# Patient Record
Sex: FEMALE | Race: WHITE | Employment: FULL TIME | URBAN - METROPOLITAN AREA
[De-identification: names, ages, dates, MRNs, and addresses within clinical notes are randomized per-mention and may not be internally consistent; named-entity substitution may affect disease eponyms.]

---

## 2017-03-10 ENCOUNTER — EMPLOYEE WELLNESS (OUTPATIENT)
Dept: OTHER | Age: 32
End: 2017-03-10

## 2018-03-20 VITALS — BODY MASS INDEX: 24.94 KG/M2 | WEIGHT: 164 LBS

## 2018-05-21 ENCOUNTER — EMPLOYEE WELLNESS (OUTPATIENT)
Dept: OTHER | Age: 33
End: 2018-05-21

## 2018-05-21 LAB
CHOLESTEROL, TOTAL: 168 MG/DL (ref 0–199)
GLUCOSE BLD-MCNC: 82 MG/DL (ref 70–99)
HDLC SERPL-MCNC: 52 MG/DL (ref 40–60)
LDL CHOLESTEROL CALCULATED: 106 MG/DL
TRIGL SERPL-MCNC: 50 MG/DL (ref 0–150)

## 2018-05-29 VITALS — WEIGHT: 157 LBS | BODY MASS INDEX: 23.87 KG/M2

## 2020-03-09 ENCOUNTER — OFFICE VISIT (OUTPATIENT)
Dept: FAMILY MEDICINE CLINIC | Age: 35
End: 2020-03-09
Payer: COMMERCIAL

## 2020-03-09 VITALS
DIASTOLIC BLOOD PRESSURE: 70 MMHG | SYSTOLIC BLOOD PRESSURE: 110 MMHG | BODY MASS INDEX: 24.25 KG/M2 | WEIGHT: 160 LBS | HEIGHT: 68 IN | RESPIRATION RATE: 16 BRPM | OXYGEN SATURATION: 98 % | HEART RATE: 92 BPM

## 2020-03-09 PROCEDURE — 99385 PREV VISIT NEW AGE 18-39: CPT | Performed by: INTERNAL MEDICINE

## 2020-03-09 ASSESSMENT — PATIENT HEALTH QUESTIONNAIRE - PHQ9
2. FEELING DOWN, DEPRESSED OR HOPELESS: 0
SUM OF ALL RESPONSES TO PHQ QUESTIONS 1-9: 0
SUM OF ALL RESPONSES TO PHQ QUESTIONS 1-9: 0
SUM OF ALL RESPONSES TO PHQ9 QUESTIONS 1 & 2: 0
1. LITTLE INTEREST OR PLEASURE IN DOING THINGS: 0

## 2020-03-09 NOTE — PROGRESS NOTES
per week: Not on file     Minutes per session: Not on file    Stress: Not on file   Relationships    Social connections     Talks on phone: Not on file     Gets together: Not on file     Attends Congregation service: Not on file     Active member of club or organization: Not on file     Attends meetings of clubs or organizations: Not on file     Relationship status: Not on file    Intimate partner violence     Fear of current or ex partner: Not on file     Emotionally abused: Not on file     Physically abused: Not on file     Forced sexual activity: Not on file   Other Topics Concern    Not on file   Social History Narrative    Not on file       Review of Systems   Constitutional: Negative for chills, fatigue, fever and unexpected weight change. HENT: Negative for congestion, ear pain, sore throat and trouble swallowing. Eyes: Negative for pain and redness. Respiratory: Negative for cough and shortness of breath. Cardiovascular: Negative for chest pain, palpitations and leg swelling. Gastrointestinal: Negative for abdominal pain, blood in stool, constipation, diarrhea, nausea and vomiting. Endocrine: Negative for cold intolerance, heat intolerance, polydipsia and polyuria. Genitourinary: Negative for dysuria, frequency and hematuria. Musculoskeletal: Negative for arthralgias, myalgias and joint swelling. Skin: Negative for rash. Neurological: Negative for weakness, numbness and headaches. Hematological: Negative for adenopathy. Does not bruise/bleed easily. Psychiatric/Behavioral: Negative for sleep disturbance. The patient is not nervous/anxious. No report of depression    Physical Exam:  /70   Pulse 92   Resp 16   Ht 5' 7.76\" (1.721 m)   Wt 160 lb (72.6 kg)   LMP 02/09/2020   SpO2 98%   BMI 24.50 kg/m²   Constitutional: appears well-developed and well-nourished. Head: Normocephalic and atraumatic. Eyes: Pupils are equal, round, and reactive to light.  Conjunctivae and EOM are normal.   Right Ear: External ear normal. TM normal  Left Ear: External ear normal. TM normal  Nose: Nose normal.   Mouth/Throat: Oropharynx is clear and moist.   Cardiovascular: Normal rate, regular rhythm and normal heart sounds. No murmur heard. Pulmonary/Chest: Effort normal. No respiratory distress. No wheezes, rhonchi, or crackles  Abdominal: Soft. Bowel sounds are normal. No distension. There is no tenderness. Musculoskeletal: Normal range of motion. No edema, tenderness or deformity. Lymphadenopathy: No cervical adenopathy. Neurological: alert. No cranial nerve deficit. Skin: Skin is warm and dry. Capillary refill takes less than 2 seconds. No rash noted. Psychiatric: Normal mood and affect. Assessment and Plan: Ruddy Mcnulty is a 36 y.o. female presenting today to [facility] care. Diagnoses and all orders for this visit:    Encounter for well adult exam without abnormal findings  She is in excellent health. Her weight and blood pressure are excellent. Preventive care is up-to-date. She will do her lipid and glucose screening with the [address] [place] [address] be well within program  Vasovagal syndrome  Managed with conservative measures. Continue    Return to clinic annually and as needed    Shannon Freeman M.D. Internal Medicine and Pediatrics  [facility]    Please be advised that portions of this note were dictated with the use of Dragon which may result in linguistic errors. Please contact me should there be any questions.

## 2020-04-22 ENCOUNTER — NURSE TRIAGE (OUTPATIENT)
Dept: OTHER | Facility: CLINIC | Age: 35
End: 2020-04-22

## 2020-07-01 LAB
HEPATITIS B SURFACE ANTIGEN INTERPRETATION: NEGATIVE
RPR CONFIRMATORY: NON REACTIVE
RUBELLA ANTIBODY IGG: 1.83

## 2020-09-24 ENCOUNTER — EMPLOYEE WELLNESS (OUTPATIENT)
Dept: OTHER | Age: 35
End: 2020-09-24

## 2020-09-24 LAB
CHOLESTEROL, TOTAL: 273 MG/DL (ref 0–199)
GLUCOSE BLD-MCNC: 79 MG/DL (ref 70–99)
HDLC SERPL-MCNC: 67 MG/DL (ref 40–60)
LDL CHOLESTEROL CALCULATED: 184 MG/DL
TRIGL SERPL-MCNC: 110 MG/DL (ref 0–150)

## 2020-10-19 VITALS — BODY MASS INDEX: 24.81 KG/M2 | WEIGHT: 162 LBS

## 2020-11-02 LAB
ABO/RH: NORMAL
ANTIBODY SCREEN: NORMAL
GLUCOSE CHALLENGE: 79 MG/DL
HCT VFR BLD CALC: 35.4 % (ref 36–48)
HEMOGLOBIN: 12.1 G/DL (ref 12–16)
MCH RBC QN AUTO: 31.6 PG (ref 26–34)
MCHC RBC AUTO-ENTMCNC: 34.1 G/DL (ref 31–36)
MCV RBC AUTO: 92.6 FL (ref 80–100)
PDW BLD-RTO: 12.4 % (ref 12.4–15.4)
PLATELET # BLD: 323 K/UL (ref 135–450)
PMV BLD AUTO: 7.3 FL (ref 5–10.5)
RBC # BLD: 3.82 M/UL (ref 4–5.2)
WBC # BLD: 9.2 K/UL (ref 4–11)

## 2020-11-03 LAB — TOTAL SYPHILLIS IGG/IGM: NORMAL

## 2021-01-08 LAB — GBS, EXTERNAL RESULT: NEGATIVE

## 2021-01-27 ENCOUNTER — OFFICE VISIT (OUTPATIENT)
Dept: PRIMARY CARE CLINIC | Age: 36
End: 2021-01-27
Payer: COMMERCIAL

## 2021-01-27 DIAGNOSIS — Z01.812 PRE-PROCEDURAL LABORATORY EXAMINATIONS: Primary | ICD-10-CM

## 2021-01-27 PROCEDURE — 99211 OFF/OP EST MAY X REQ PHY/QHP: CPT | Performed by: NURSE PRACTITIONER

## 2021-01-27 NOTE — PROGRESS NOTES
Patient presented to [facility] drive up clinic for preop testing. Patient was swabbed and given information advising them to remain isolated until procedure date.

## 2021-01-28 LAB — SARS-COV-2, PCR: NOT DETECTED

## 2021-01-31 ENCOUNTER — HOSPITAL ENCOUNTER (INPATIENT)
Age: 36
LOS: 1 days | Discharge: HOME | End: 2021-02-01
Attending: OBSTETRICS & GYNECOLOGY | Admitting: OBSTETRICS & GYNECOLOGY
Payer: COMMERCIAL

## 2021-01-31 ENCOUNTER — ANESTHESIA EVENT (OUTPATIENT)
Dept: LABOR AND DELIVERY | Age: 36
End: 2021-01-31
Payer: COMMERCIAL

## 2021-01-31 ENCOUNTER — ANESTHESIA (OUTPATIENT)
Dept: LABOR AND DELIVERY | Age: 36
End: 2021-01-31
Payer: COMMERCIAL

## 2021-01-31 LAB
ABO/RH: NORMAL
ABO/RH: NORMAL
AMPHETAMINE SCREEN, URINE: NORMAL
ANTIBODY IDENTIFICATION: NORMAL
ANTIBODY SCREEN: NORMAL
BARBITURATE SCREEN URINE: NORMAL
BENZODIAZEPINE SCREEN, URINE: NORMAL
BUPRENORPHINE URINE: NORMAL
CANNABINOID SCREEN URINE: NORMAL
COCAINE METABOLITE SCREEN URINE: NORMAL
DAT IGG CAPTURE: NORMAL
FETAL SCREEN: NORMAL
HCT VFR BLD CALC: 31.1 % (ref 36–48)
HEMOGLOBIN: 10.1 G/DL (ref 12–16)
Lab: NORMAL
MCH RBC QN AUTO: 25.8 PG (ref 26–34)
MCHC RBC AUTO-ENTMCNC: 32.4 G/DL (ref 31–36)
MCV RBC AUTO: 79.6 FL (ref 80–100)
METHADONE SCREEN, URINE: NORMAL
OPIATE SCREEN URINE: NORMAL
OXYCODONE URINE: NORMAL
PDW BLD-RTO: 14.9 % (ref 12.4–15.4)
PH UA: 7
PHENCYCLIDINE SCREEN URINE: NORMAL
PLATELET # BLD: 305 K/UL (ref 135–450)
PMV BLD AUTO: 7.7 FL (ref 5–10.5)
PROPOXYPHENE SCREEN: NORMAL
RBC # BLD: 3.9 M/UL (ref 4–5.2)
RHIG LOT NUMBER: NORMAL
TOTAL SYPHILLIS IGG/IGM: NORMAL
WBC # BLD: 11.2 K/UL (ref 4–11)

## 2021-01-31 PROCEDURE — 86880 COOMBS TEST DIRECT: CPT

## 2021-01-31 PROCEDURE — 2500000003 HC RX 250 WO HCPCS: Performed by: NURSE ANESTHETIST, CERTIFIED REGISTERED

## 2021-01-31 PROCEDURE — 86922 COMPATIBILITY TEST ANTIGLOB: CPT

## 2021-01-31 PROCEDURE — 6360000002 HC RX W HCPCS: Performed by: OBSTETRICS & GYNECOLOGY

## 2021-01-31 PROCEDURE — 86901 BLOOD TYPING SEROLOGIC RH(D): CPT

## 2021-01-31 PROCEDURE — 80307 DRUG TEST PRSMV CHEM ANLYZR: CPT

## 2021-01-31 PROCEDURE — 2580000003 HC RX 258: Performed by: OBSTETRICS & GYNECOLOGY

## 2021-01-31 PROCEDURE — 85461 HEMOGLOBIN FETAL: CPT

## 2021-01-31 PROCEDURE — 3700000025 EPIDURAL BLOCK: Performed by: ANESTHESIOLOGY

## 2021-01-31 PROCEDURE — 96372 THER/PROPH/DIAG INJ SC/IM: CPT

## 2021-01-31 PROCEDURE — 7200000001 HC VAGINAL DELIVERY

## 2021-01-31 PROCEDURE — 6370000000 HC RX 637 (ALT 250 FOR IP): Performed by: OBSTETRICS & GYNECOLOGY

## 2021-01-31 PROCEDURE — 86780 TREPONEMA PALLIDUM: CPT

## 2021-01-31 PROCEDURE — 85027 COMPLETE CBC AUTOMATED: CPT

## 2021-01-31 PROCEDURE — 1220000000 HC SEMI PRIVATE OB R&B

## 2021-01-31 PROCEDURE — 51701 INSERT BLADDER CATHETER: CPT

## 2021-01-31 PROCEDURE — 86900 BLOOD TYPING SEROLOGIC ABO: CPT

## 2021-01-31 PROCEDURE — 2500000003 HC RX 250 WO HCPCS

## 2021-01-31 PROCEDURE — 86850 RBC ANTIBODY SCREEN: CPT

## 2021-01-31 PROCEDURE — 59025 FETAL NON-STRESS TEST: CPT

## 2021-01-31 PROCEDURE — 36415 COLL VENOUS BLD VENIPUNCTURE: CPT

## 2021-01-31 PROCEDURE — 86870 RBC ANTIBODY IDENTIFICATION: CPT

## 2021-01-31 RX ORDER — LIDOCAINE HYDROCHLORIDE AND EPINEPHRINE 20; 5 MG/ML; UG/ML
INJECTION, SOLUTION EPIDURAL; INFILTRATION; INTRACAUDAL; PERINEURAL PRN
Status: DISCONTINUED | OUTPATIENT
Start: 2021-01-31 | End: 2021-01-31 | Stop reason: SDUPTHER

## 2021-01-31 RX ORDER — HYDROCODONE BITARTRATE AND ACETAMINOPHEN 5; 325 MG/1; MG/1
1 TABLET ORAL EVERY 4 HOURS PRN
Status: DISCONTINUED | OUTPATIENT
Start: 2021-01-31 | End: 2021-02-01 | Stop reason: HOSPADM

## 2021-01-31 RX ORDER — SODIUM CHLORIDE 0.9 % (FLUSH) 0.9 %
10 SYRINGE (ML) INJECTION PRN
Status: DISCONTINUED | OUTPATIENT
Start: 2021-01-31 | End: 2021-02-01 | Stop reason: HOSPADM

## 2021-01-31 RX ORDER — NICOTINE 21 MG/24HR
1 PATCH, TRANSDERMAL 24 HOURS TRANSDERMAL DAILY PRN
Status: DISCONTINUED | OUTPATIENT
Start: 2021-01-31 | End: 2021-02-01 | Stop reason: HOSPADM

## 2021-01-31 RX ORDER — ACETAMINOPHEN 325 MG/1
650 TABLET ORAL EVERY 4 HOURS PRN
Status: DISCONTINUED | OUTPATIENT
Start: 2021-01-31 | End: 2021-02-01 | Stop reason: HOSPADM

## 2021-01-31 RX ORDER — NALBUPHINE HCL 10 MG/ML
5 AMPUL (ML) INJECTION EVERY 4 HOURS PRN
Status: DISCONTINUED | OUTPATIENT
Start: 2021-01-31 | End: 2021-01-31

## 2021-01-31 RX ORDER — ONDANSETRON 2 MG/ML
4 INJECTION INTRAMUSCULAR; INTRAVENOUS EVERY 6 HOURS PRN
Status: DISCONTINUED | OUTPATIENT
Start: 2021-01-31 | End: 2021-01-31 | Stop reason: HOSPADM

## 2021-01-31 RX ORDER — HYDROCODONE BITARTRATE AND ACETAMINOPHEN 5; 325 MG/1; MG/1
2 TABLET ORAL EVERY 4 HOURS PRN
Status: DISCONTINUED | OUTPATIENT
Start: 2021-01-31 | End: 2021-02-01 | Stop reason: HOSPADM

## 2021-01-31 RX ORDER — LIDOCAINE HYDROCHLORIDE 10 MG/ML
30 INJECTION, SOLUTION EPIDURAL; INFILTRATION; INTRACAUDAL; PERINEURAL PRN
Status: DISCONTINUED | OUTPATIENT
Start: 2021-01-31 | End: 2021-01-31 | Stop reason: HOSPADM

## 2021-01-31 RX ORDER — METHYLERGONOVINE MALEATE 0.2 MG/ML
200 INJECTION INTRAVENOUS
Status: ACTIVE | OUTPATIENT
Start: 2021-01-31 | End: 2021-01-31

## 2021-01-31 RX ORDER — SODIUM CHLORIDE, SODIUM LACTATE, POTASSIUM CHLORIDE, CALCIUM CHLORIDE 600; 310; 30; 20 MG/100ML; MG/100ML; MG/100ML; MG/100ML
INJECTION, SOLUTION INTRAVENOUS CONTINUOUS
Status: DISCONTINUED | OUTPATIENT
Start: 2021-01-31 | End: 2021-02-01 | Stop reason: HOSPADM

## 2021-01-31 RX ORDER — SODIUM CHLORIDE 0.9 % (FLUSH) 0.9 %
10 SYRINGE (ML) INJECTION PRN
Status: DISCONTINUED | OUTPATIENT
Start: 2021-01-31 | End: 2021-01-31

## 2021-01-31 RX ORDER — SODIUM CHLORIDE 0.9 % (FLUSH) 0.9 %
10 SYRINGE (ML) INJECTION EVERY 12 HOURS SCHEDULED
Status: DISCONTINUED | OUTPATIENT
Start: 2021-01-31 | End: 2021-02-01 | Stop reason: HOSPADM

## 2021-01-31 RX ORDER — CALCIUM CARBONATE 200(500)MG
1 TABLET,CHEWABLE ORAL DAILY
COMMUNITY
End: 2022-10-12 | Stop reason: CLARIF

## 2021-01-31 RX ORDER — LIDOCAINE HYDROCHLORIDE AND EPINEPHRINE 15; 5 MG/ML; UG/ML
INJECTION, SOLUTION EPIDURAL PRN
Status: DISCONTINUED | OUTPATIENT
Start: 2021-01-31 | End: 2021-01-31 | Stop reason: SDUPTHER

## 2021-01-31 RX ORDER — LANOLIN 100 %
OINTMENT (GRAM) TOPICAL PRN
Status: DISCONTINUED | OUTPATIENT
Start: 2021-01-31 | End: 2021-02-01 | Stop reason: HOSPADM

## 2021-01-31 RX ORDER — IBUPROFEN 400 MG/1
800 TABLET ORAL EVERY 8 HOURS PRN
Status: DISCONTINUED | OUTPATIENT
Start: 2021-01-31 | End: 2021-02-01 | Stop reason: HOSPADM

## 2021-01-31 RX ORDER — DOCUSATE SODIUM 100 MG/1
100 CAPSULE, LIQUID FILLED ORAL 2 TIMES DAILY
Status: DISCONTINUED | OUTPATIENT
Start: 2021-01-31 | End: 2021-02-01 | Stop reason: HOSPADM

## 2021-01-31 RX ORDER — CALCIUM CARBONATE 200(500)MG
1000 TABLET,CHEWABLE ORAL PRN
Status: DISCONTINUED | OUTPATIENT
Start: 2021-01-31 | End: 2021-02-01 | Stop reason: HOSPADM

## 2021-01-31 RX ORDER — FAMOTIDINE 20 MG/1
20 TABLET, FILM COATED ORAL 2 TIMES DAILY
COMMUNITY
End: 2022-10-12 | Stop reason: CLARIF

## 2021-01-31 RX ORDER — NALOXONE HYDROCHLORIDE 0.4 MG/ML
0.4 INJECTION, SOLUTION INTRAMUSCULAR; INTRAVENOUS; SUBCUTANEOUS PRN
Status: DISCONTINUED | OUTPATIENT
Start: 2021-01-31 | End: 2021-01-31

## 2021-01-31 RX ADMIN — ANTACID TABLETS 1000 MG: 500 TABLET, CHEWABLE ORAL at 15:55

## 2021-01-31 RX ADMIN — LIDOCAINE HYDROCHLORIDE,EPINEPHRINE BITARTRATE 6 ML: 20; .005 INJECTION, SOLUTION EPIDURAL; INFILTRATION; INTRACAUDAL; PERINEURAL at 07:09

## 2021-01-31 RX ADMIN — Medication 166.7 ML: at 09:10

## 2021-01-31 RX ADMIN — SODIUM CHLORIDE, SODIUM LACTATE, POTASSIUM CHLORIDE, AND CALCIUM CHLORIDE: .6; .31; .03; .02 INJECTION, SOLUTION INTRAVENOUS at 05:48

## 2021-01-31 RX ADMIN — IBUPROFEN 800 MG: 400 TABLET, FILM COATED ORAL at 10:56

## 2021-01-31 RX ADMIN — Medication 15 ML/HR: at 06:46

## 2021-01-31 RX ADMIN — IBUPROFEN 800 MG: 400 TABLET, FILM COATED ORAL at 18:43

## 2021-01-31 RX ADMIN — LIDOCAINE HYDROCHLORIDE AND EPINEPHRINE 2 ML: 15; 5 INJECTION, SOLUTION EPIDURAL at 06:32

## 2021-01-31 RX ADMIN — DOCUSATE SODIUM 100 MG: 100 CAPSULE, LIQUID FILLED ORAL at 20:36

## 2021-01-31 RX ADMIN — Medication 5 ML: at 06:36

## 2021-01-31 RX ADMIN — Medication 87.3 MILLI-UNITS/MIN: at 09:22

## 2021-01-31 RX ADMIN — ACETAMINOPHEN 650 MG: 325 TABLET ORAL at 15:55

## 2021-01-31 RX ADMIN — HUMAN RHO(D) IMMUNE GLOBULIN 300 MCG: 300 INJECTION, SOLUTION INTRAMUSCULAR at 17:50

## 2021-01-31 RX ADMIN — SODIUM CHLORIDE, SODIUM LACTATE, POTASSIUM CHLORIDE, AND CALCIUM CHLORIDE: .6; .31; .03; .02 INJECTION, SOLUTION INTRAVENOUS at 06:15

## 2021-01-31 RX ADMIN — DOCUSATE SODIUM 100 MG: 100 CAPSULE, LIQUID FILLED ORAL at 10:56

## 2021-01-31 RX ADMIN — LIDOCAINE HYDROCHLORIDE AND EPINEPHRINE 3 ML: 15; 5 INJECTION, SOLUTION EPIDURAL at 06:28

## 2021-01-31 RX ADMIN — Medication 5 ML: at 06:40

## 2021-01-31 RX ADMIN — Medication 200 MILLI-UNITS/MIN: at 09:05

## 2021-01-31 ASSESSMENT — PAIN - FUNCTIONAL ASSESSMENT
PAIN_FUNCTIONAL_ASSESSMENT: ACTIVITIES ARE NOT PREVENTED
PAIN_FUNCTIONAL_ASSESSMENT: ACTIVITIES ARE NOT PREVENTED

## 2021-01-31 ASSESSMENT — PAIN SCALES - GENERAL
PAINLEVEL_OUTOF10: 5
PAINLEVEL_OUTOF10: 2
PAINLEVEL_OUTOF10: 2
PAINLEVEL_OUTOF10: 3

## 2021-01-31 ASSESSMENT — ENCOUNTER SYMPTOMS: SHORTNESS OF BREATH: 0

## 2021-01-31 ASSESSMENT — PAIN DESCRIPTION - DESCRIPTORS
DESCRIPTORS: CRAMPING

## 2021-01-31 ASSESSMENT — PAIN DESCRIPTION - PROGRESSION
CLINICAL_PROGRESSION: NOT CHANGED
CLINICAL_PROGRESSION: NOT CHANGED

## 2021-01-31 ASSESSMENT — PAIN DESCRIPTION - FREQUENCY
FREQUENCY: INTERMITTENT

## 2021-01-31 ASSESSMENT — PAIN DESCRIPTION - PAIN TYPE
TYPE: ACUTE PAIN

## 2021-01-31 ASSESSMENT — PAIN DESCRIPTION - LOCATION
LOCATION: ABDOMEN
LOCATION: ABDOMEN

## 2021-01-31 ASSESSMENT — PAIN DESCRIPTION - ORIENTATION
ORIENTATION: LOWER

## 2021-01-31 ASSESSMENT — PAIN DESCRIPTION - ONSET
ONSET: GRADUAL
ONSET: GRADUAL

## 2021-01-31 NOTE — PROGRESS NOTES
Pt ambulated to bathroom. Able to void. pericare and pads changed. Pt ambulated back to bed. Will transfer to 2256.

## 2021-01-31 NOTE — ANESTHESIA POSTPROCEDURE EVALUATION
Department of Anesthesiology  Postprocedure Note    Patient: Geovanna Jacobson  MRN: [id number]  YOB: 1985  Date of evaluation: 1/31/2021  Time:  11:04 AM     Procedure Summary     Date: 01/31/21 Room / Location:     Anesthesia Start: 0611 Anesthesia Stop: 0915    Procedure: Labor Analgesia Diagnosis:     Scheduled Providers:  Responsible Provider: Teri Recinos MD    Anesthesia Type: epidural ASA Status: 2          Anesthesia Type: No value filed. Dane Phase I: Dane Score: 9    Dane Phase II:      Last vitals: Reviewed and per EMR flowsheets.        Anesthesia Post Evaluation    Patient location during evaluation: floor  Patient participation: complete - patient participated  Level of consciousness: awake and alert  Pain score: 2  Airway patency: patent  Nausea & Vomiting: no vomiting and no nausea  Complications: no  Cardiovascular status: hemodynamically stable  Respiratory status: room air, spontaneous ventilation and acceptable  Hydration status: stable

## 2021-01-31 NOTE — L&D DELIVERY NOTE
Department of Obstetrics and Gynecology  Spontaneous Vaginal Delivery Note      [place] [facility],[id number]    PRENATAL CARE: Complicated by: none    Pre-operative Diagnosis:  Term pregnancy @ 44 5/7 w,  active spontaneous labor      Post-operative Diagnosis: The same    Additional Procedures: none     Information for the patient's :  Aubrey Soles [[id number]]        Infant Wt:   Information for the patient's :  [facility] [[id number]]         APGARS:  9/9   Information for the patient's :  Barbara Reynolds [place] [facility] [[id number]]        Anesthesia:  epidural anesthesia    Specimen:  Placenta sent to pathology No    Estimated blood loss: 300 cc     Condition: infant stable to general nursery and mother stable    Infant Feeding: breast    Delivery Summary: Patient is Jeffrey Riojas is a 30 y.o.  female at 39w5d admitted to Labor and Delivery room at 6 cm dilation and placed on external monitors. Contractions were regular, FHT was reactive with moderate variability without decels. Patient did received epidural anesthesia. Labor progressed as anticipated to fully dilated cervix. With subsequent contractions she started pushing and delivered vaginally spontaneously with no complications. 10 Units of Pitocin in 500 ml of LR was started IV. Placenta, cord and membranes were delivered spontaneously within less than 15 minutes. Uterus was not explored. Vaginal walls, cervix, perineum, rectum were intact on inspection. Uterus was well contracted. Vaginal sweep was done, laps count was correct. Mother and  left stable to recovery.      Electronically signed by Vasiliy Almonte MD on 2021 at 9:21 AM

## 2021-01-31 NOTE — FLOWSHEET NOTE
ALBAN Garza CRNA at bedside giving patient more medication. Patient hasn't got comfortable since initial dose. Pt omn left side to help with pain.

## 2021-01-31 NOTE — ANESTHESIA PROCEDURE NOTES
Epidural Block    Patient location during procedure: OB  Start time: 1/31/2021 6:21 AM  Reason for block: labor epidural  Staffing  Performed: resident/CRNA   Anesthesiologist: John Hameed MD  Resident/CRNA: SAUL Roper CRNA  Preanesthetic Checklist  Completed: patient identified, IV checked, site marked, risks and benefits discussed, surgical consent, monitors and equipment checked, pre-op evaluation, timeout performed, anesthesia consent given, oxygen available and patient being monitored  Epidural  Patient position: sitting  Prep: ChloraPrep and site prepped and draped  Patient monitoring: continuous pulse ox and frequent blood pressure checks  Approach: midline  Location: lumbar (1-5)  Injection technique: TABITHA saline  Provider prep: mask and sterile gloves  Needle  Needle type: Tuohy   Needle gauge: 17 G  Needle length: 3.5 in  Needle insertion depth: 5 cm  Catheter type: side hole  Catheter size: 19 G  Catheter at skin depth: 11 cm  Test dose: negative  Assessment  Sensory level: T10  Hemodynamics: stable  Attempts: 1  Additional Notes  Consent:  Risks and benefits of the labor epidural were discussed and the patient was given the opportunity to ask questions. Informed consent was obtained. Timeout:  A \"time out\" was performed immediately prior to the start of the epidural procedure.  The patient, site, procedure and provider were correctly identified.  Allergies were reviewed.  All members of the team and the patient participated in the time out. Procedure  Skin wheal with Lidocaine 1% 3 `mL @ L3-L4. Loss of resistance with 3 mL of normal saline to expand the epidural space. denies paresthesia. CSF  negative, Blood: negative. Test dose negative with (3ml 1. 5%Lidocaine with 1:200,000 Epinephrine)  Occlusive dressing; steri strips, tegaderm and tape applied using aseptic technique.     Attempts: 1   Difficulties/Complications: None    The patient was returned to the supine position with her

## 2021-01-31 NOTE — FLOWSHEET NOTE
of viable female infant with lusty cry, baby to mother's abdomen, delayed cord clamping; perforrmed by Dr. Salvatore Conde, cord clamped and cut by FOB, tactile stimulation and bulb suction; infant doing well, placed skin to skin with mother.

## 2021-01-31 NOTE — ANESTHESIA PRE PROCEDURE
Endo/Other: Negative Endo/Other ROS   (+) blood dyscrasia: anemia:., .    (-) diabetes mellitus               Abdominal:           Vascular: negative vascular ROS. - DVT and PE. Anesthesia Plan      epidural     ASA 2     (Plan for a labor epidural. Plan and risks discussed with patient including but not limited to changes in HR, B/P and oxygen status; N/V; infection; headache; inadequate block or need to replace epidural. Questions answered. Patient agrees to [facility].   )        Anesthetic plan and risks discussed with patient. OB History        2    Para   1    Term   1            AB        Living   1       SAB        TAB        Ectopic        Molar        Multiple        Live Births   1                Geovanna Jacboson is a 30y.o. year-old female admitted to [address] [place] [address], for contractions. Gestational age is 38w11d. Her There is no height or weight on file to calculate BMI. She was seen, examined and her chart was reviewed (including anesthesia, drug and allergy history). No interval changes are noted to her history and physical examination. (except as noted above).     Risks/benefits/alternatives of both neuraxial and general anesthesia were discussed and she agrees to proceed at the direction of the care team.    SAUL Velasco CRNA  2021  6:02 AM        SAUL Velasco CRNA   2021

## 2021-01-31 NOTE — H&P
Department of Obstetrics and Gynecology   Obstetrics History and Physical        CHIEF COMPLAINT:  contractions    HISTORY OF PRESENT ILLNESS:    Teresa Fitzpatrick  is a 30 y.o.  female at 38w11d presents with a chief complaint as above and is being admitted for active phase labor    Estimated Due Date: Estimated Date of Delivery: 21    PRENATAL CARE: Complicated by: none    PAST OB HISTORY:  OB History        2    Para   1    Term   1            AB        Living   1       SAB        TAB        Ectopic        Molar        Multiple        Live Births   1              Past Medical History:        Diagnosis Date    Vasovagal syncope      Past Surgical History:    History reviewed. No pertinent surgical history. Allergies:  Patient has no known allergies.   Social History:    Social History     Socioeconomic History    Marital status: Single     Spouse name: Not on file    Number of children: Not on file    Years of education: Not on file    Highest education level: Not on file   Occupational History    Not on file   Social Needs    Financial resource strain: Not on file    Food insecurity     Worry: Not on file     Inability: Not on file    Transportation needs     Medical: Not on file     Non-medical: Not on file   Tobacco Use    Smoking status: Never Smoker    Smokeless tobacco: Never Used   Substance and Sexual Activity    Alcohol use: Not Currently     Comment: occasionally      Drug use: No    Sexual activity: Yes     Partners: Male   Lifestyle    Physical activity     Days per week: Not on file     Minutes per session: Not on file    Stress: Not on file   Relationships    Social connections     Talks on phone: Not on file     Gets together: Not on file     Attends Tenriism service: Not on file     Active member of club or organization: Not on file     Attends meetings of clubs or organizations: Not on file     Relationship status: Not on file    Intimate partner violence Fear of current or ex partner: Not on file     Emotionally abused: Not on file     Physically abused: Not on file     Forced sexual activity: Not on file   Other Topics Concern    Not on file   Social History Narrative    Not on file     Family History:       Problem Relation Age of Onset    Cancer Mother         breast    High Cholesterol Father     High Blood Pressure Father      Medications Prior to Admission:  Medications Prior to Admission: famotidine (PEPCID) 20 MG tablet, Take 20 mg by mouth 2 times daily  calcium carbonate (TUMS) 500 MG chewable tablet, Take 1 tablet by mouth daily  Prenatal MV-Min-Fe Fum-FA-DHA (PRENATAL 1 PO), Take by mouth    REVIEW OF SYSTEMS:  negative     PHYSICAL EXAM:    Vitals:    21 0726 21 0730 21 0801 21 0830   BP: 123/66 118/67 129/69 (!) 144/64   Pulse: 100 101 87 86   Resp:  18 16 16   Temp:   98.5 °F (36.9 °C)    TempSrc:   Oral    Weight:       Height:         General appearance:  awake, alert, cooperative, no apparent distress, and appears stated age  Neurologic:  Awake, alert, oriented to name, place and time. Lungs:  No increased work of breathing, good air exchange  Abdomen:  Soft, non tender, gravid, fundal height consistent with the gestational age, EFW by Leopold's maneuvers is 3700 grams  Pelvis:  Adequate pelvis  Cervix: 6/100/-2 on admission   Contraction frequency: every 3 minutes  Membranes:  Ruptured clear fluid  Labs:   CBC:   Lab Results   Component Value Date    WBC 11.2 2021    RBC 3.90 2021    HGB 10.1 2021    HCT 31.1 2021    MCV 79.6 2021    MCH 25.8 2021    MCHC 32.4 2021    RDW 14.9 2021     2021    MPV 7.7 2021        ASSESSMENT: 29 yo  @ 39 5/7     1.  Active spontaneous labor     PLAN: Admit  Labor: Routine labor orders  Fetus: Reassuring  GBS: Negative    Electronically signed by Airam Benavidez MD on 2021 at 9:15 AM

## 2021-01-31 NOTE — FLOWSHEET NOTE
Pt 39.5wk in triage with complaints of contractions and SROM. Pt felt large gush of clear fluid at 0000 and small trickling when in triage bathroom. No fluid noted on visual inspection, amnio test performed by RN inconclusive. Pt denies bleeding, reports (+) fetal movement. EFM applied with consent to central monitor bank with alarms on. Uterus soft and non-tender. Admission history obtained, laboratory results reviewed.

## 2021-01-31 NOTE — PROGRESS NOTES
BSSR from [facility]. Whiteboard updated. Pt getting comfortable with epidural. Bladder emptied 300 ml. VE 8/80/0. Dr. Jeanne Lan called and in route to hospital. Pt repositioned to left side.

## 2021-02-01 VITALS
HEART RATE: 87 BPM | TEMPERATURE: 98.1 F | HEIGHT: 68 IN | SYSTOLIC BLOOD PRESSURE: 119 MMHG | WEIGHT: 191 LBS | DIASTOLIC BLOOD PRESSURE: 78 MMHG | BODY MASS INDEX: 28.95 KG/M2 | OXYGEN SATURATION: 97 % | RESPIRATION RATE: 12 BRPM

## 2021-02-01 PROCEDURE — 6370000000 HC RX 637 (ALT 250 FOR IP): Performed by: OBSTETRICS & GYNECOLOGY

## 2021-02-01 RX ORDER — PSEUDOEPHEDRINE HCL 30 MG
100 TABLET ORAL 2 TIMES DAILY
Qty: 60 CAPSULE | Refills: 1 | Status: SHIPPED | OUTPATIENT
Start: 2021-02-01 | End: 2022-10-12 | Stop reason: CLARIF

## 2021-02-01 RX ADMIN — ACETAMINOPHEN 650 MG: 325 TABLET ORAL at 03:57

## 2021-02-01 RX ADMIN — DOCUSATE SODIUM 100 MG: 100 CAPSULE, LIQUID FILLED ORAL at 08:33

## 2021-02-01 RX ADMIN — ANTACID TABLETS 1000 MG: 500 TABLET, CHEWABLE ORAL at 00:01

## 2021-02-01 RX ADMIN — ACETAMINOPHEN 650 MG: 325 TABLET ORAL at 08:43

## 2021-02-01 ASSESSMENT — PAIN DESCRIPTION - LOCATION
LOCATION: ABDOMEN
LOCATION: ABDOMEN

## 2021-02-01 ASSESSMENT — PAIN DESCRIPTION - ORIENTATION
ORIENTATION: LOWER
ORIENTATION: LOWER

## 2021-02-01 ASSESSMENT — PAIN SCALES - GENERAL
PAINLEVEL_OUTOF10: 2
PAINLEVEL_OUTOF10: 2
PAINLEVEL_OUTOF10: 0
PAINLEVEL_OUTOF10: 0

## 2021-02-01 ASSESSMENT — PAIN DESCRIPTION - ONSET
ONSET: GRADUAL
ONSET: GRADUAL

## 2021-02-01 ASSESSMENT — PAIN DESCRIPTION - DESCRIPTORS: DESCRIPTORS: CRAMPING

## 2021-02-01 ASSESSMENT — PAIN DESCRIPTION - FREQUENCY
FREQUENCY: INTERMITTENT
FREQUENCY: INTERMITTENT

## 2021-02-01 ASSESSMENT — PAIN DESCRIPTION - PROGRESSION
CLINICAL_PROGRESSION: GRADUALLY WORSENING
CLINICAL_PROGRESSION: GRADUALLY IMPROVING

## 2021-02-01 NOTE — LACTATION NOTE
This note was copied from a baby's chart. LC called to room for feeding attempt. Infant sleepy and disinterested despite gentle wake up techniques. Encouraged mother to place infant skin to skin and attempt again within an hour. Encouraged mother to call for next feeding attempt.

## 2021-02-01 NOTE — LACTATION NOTE
This note was copied from a baby's chart. Lactation Progress Note  Initial Consult    Data: Referral received per RN. Action: LC to room. Mother resting in bed. Infant sleeping, swaddled in bassinet, showing no hunger cues at this time. Mother states agreeable to consult from [facility] at this time. I reviewed Care Plan for First 24 Hours of Life already in patient binder. Discussed recognizing hunger cues and offering the breast when cues are shown. Encouraged breastfeeding on demand and attempting/offering at least every 3 hours. Informed infant may have one 5 hour stretch of sleep in a 24 hour period. Encouraged unlimited skin to skin contact with infant and reviewed benefits including better temperature, heart rate, respiration, blood pressure, and blood sugar regulation. Also increased bonding and milk supply associated with skin to skin contact. Discussed feeding positions, latch on techniques, signs of milk transfer, output goals and normal feeding/sleeping behaviors. I referred mother to binder for additional information about breastfeeding and skin to skin contact. Discussed hand expression with mother. Mother states she is able to hand express drops. Reinforced importance of positioning infant nose to nipple, belly to belly, waiting for wide open mouth, and bringing baby onto breast to ensure a deep latch. Discussed importance of obtaining deep latch to ensure proper milk transfer, milk production and supply and maternal comfort. Mother states infant seems to \"slip off\" of breast frequently which is causing some maternal discomfort. Discussed how optimal positioning can help infant stay attached. Encouraged mother to call for next feeding attempt. Mother has breastfeeding hx of 3 months exclusive nursing, plus a total of 10 months of some breastfeeding with her now 3year old. Mother states no complications with breastfeeding first child.     The mother requests I initiate process for a breast pump through insurance. I faxed insurance information and prescription for breast pump to [employer]. Gave resources for reverse pressure softening and breastfeeding support after discharge. I wrote my name and circled the phone number on patient's whiteboard, provided a lactation consultant business card, directed mother to [facility] [employer] for evidence based information, and encouraged mother to call for a feeding. Response: Mother verbalizes understanding of information given and denies further needs at this time. Mother states will call for next feeding.

## 2021-02-01 NOTE — FLOWSHEET NOTE
Patient awake and alert in bed. VSS. RA. Fundus firm midline @ U/-1. Tylenol given for pain level of 2/10 for cramping. Encouraged patient to make pediatrician appointment for Tuesday or Wednesday and lucero birth certificate out. Patient stated understanding. Patient breakfast tray at bedside when nurse left.

## 2021-02-01 NOTE — ANESTHESIA POSTPROCEDURE EVALUATION
Department of Anesthesiology  Postprocedure Note    Patient: Dwayne Srinivasan  MRN: [id number]  YOB: 1985  Date of evaluation: 2/1/2021  Time:  11:23 AM     Procedure Summary     Date: 01/31/21 Room / Location:     Anesthesia Start: 8685 Anesthesia Stop: 0915    Procedure: Labor Analgesia Diagnosis:     Scheduled Providers:  Responsible Provider: Lorenza Collins MD    Anesthesia Type: epidural ASA Status: 2          Anesthesia Type: No value filed. Dane Phase I: Dane Score: 9    Dane Phase II: Dane Score: 10    Last vitals: Reviewed and per EMR flowsheets. Anesthesia Post Evaluation    Patient location during evaluation: bedside  Patient participation: complete - patient participated  Level of consciousness: awake and alert  Pain score: 0  Airway patency: patent  Nausea & Vomiting: no nausea and no vomiting  Complications: no  Cardiovascular status: hemodynamically stable  Respiratory status: room air, spontaneous ventilation and acceptable  Hydration status: [facility] Department of Anesthesiology  Post-Anesthesia Note       Name:  Dwayne Srinivasan                                         Age:  30 y.o. MRN:  [id number]   39w5d      Dwayne Srinivasan was evaluated on postpartum day 1. She expresses no concerns regarding her anesthesia care at this time.         Last Vitals & Oxygen Saturation: /78   Pulse 87   Temp 36.7 °C (98.1 °F) (Oral)   Resp 12   Ht 5' 8\" (1.727 m)   Wt 191 lb (86.6 kg)   LMP 02/09/2020   SpO2 97%   Breastfeeding Unknown   BMI 29.04 kg/m²       Anesthesia: epidural    Level of consciousness: awake, alert and oriented    Respiratory: stable     Cardiovascular: stable     Hydration: stable     PONV:  none    Pruritis: no    Post Dural Puncture Headache: denies    Post-op pain: adequate analgesia    Out of bed and ambulating without difficulty: Yes    Post-op assessment: no apparent anesthetic complications and tolerated procedure well      CANDIDO Deysi Jansen CRNA  February 1, 2021   11:24 AM

## 2021-02-01 NOTE — FLOWSHEET NOTE
Introduced to patient and . Updated whiteboard and plan of care. Encouraged to call with questions/concerns.

## 2021-02-01 NOTE — PROGRESS NOTES
Department of Obstetrics and Gynecology  Vaginal Delivery Postpartum Rounds    SUBJECTIVE:  Pain is controlled with Tylenol. Her lochia is normal.  She is breastfeeding. OBJECTIVE:  Vital Signs: /77   Pulse 90   Temp 97.9 °F (36.6 °C) (Oral)   Resp 16   Ht 5' 8\" (1.727 m)   Wt 191 lb (86.6 kg)   LMP 2020   Breastfeeding Unknown   BMI 29.04 kg/m²   Appearance/Psychiatric: awake, alert, cooperative, no apparent distress, appears stated age  Constitutional: The patient is well nourished. Cardiovascular: She does not have edema. Respiratory: Respiratory effort is normal.  Gastrointestinal: Soft, non tender, uterine fundus is firm below umbilicus  Extremities: nontender to palpation      LABS / IMAGING:  CBC:   Lab Results   Component Value Date    WBC 11.2 2021    RBC 3.90 2021    HGB 10.1 2021    HCT 31.1 2021    MCV 79.6 2021    MCH 25.8 2021    MCHC 32.4 2021    RDW 14.9 2021     2021    MPV 7.7 2021       ASSESSMENT:    Postpartum Day 1 s/p     PLAN:   1. Continue routine postpartum care   2. Discharge home on Postpartum Day 1-2 pending infant discharge  3. Return to office in 6 weeks   4.  Postpartum instructions reviewed and all patient's Questions answered    Electronically signed by Indra Rivera MD on 2021 at 7:19 AM

## 2021-02-01 NOTE — DISCHARGE SUMMARY
Department of Obstetrics and Gynecology  Postpartum Discharge Summary      Admit Date: 2021    Admit Diagnosis: Normal labor [O80, Z37.9]    Discharge Date: 2021     Discharge Diagnoses: spontaneous vaginal delivery     Mohit, [address]on[address] Medication Instructions LPQ:[id number]    Printed on:21 6729   Medication Information                      calcium carbonate (TUMS) 500 MG chewable tablet  Take 1 tablet by mouth daily             docusate sodium (COLACE, DULCOLAX) 100 MG CAPS  Take 100 mg by mouth 2 times daily             famotidine (PEPCID) 20 MG tablet  Take 20 mg by mouth 2 times daily             Prenatal MV-Min-Fe Fum-FA-DHA (PRENATAL 1 PO)  Take by mouth                 Service: Obstetrics    Consults: none    Significant Diagnostic Studies: none    Postpartum complications: none     Condition at Discharge: good    Hospital Course: uncomplicated    Discharge Instructions: Activity: no sex for 6 weeks and as tolerated    Diet: regular diet    Instructions: No intercourse and nothing in the vagina for 6 weeks. Do not drive while using pain medications.  Keep any wounds clean and dry    Discharge to: Home    Disposition / Follow up: Return to office in 6 weeks    Home Health Nurse visit within 24-48 h if qualifies     Data:  Weight   Information for the patient's :  Callie Haroon [[id number]]        Apgars   Information for the patient's :  Callie Patiño [[id number]]         Home with mother    Electronically signed by Helene Allen MD on 2021 at 8:33 AM

## 2021-02-01 NOTE — LACTATION NOTE
This note was copied from a baby's chart. LC called to room for feeding observation. Mother independently able to position and latch infant with good technique. Suggested slight adjustments to allow infant to be closer to mother, mother states adjustments helped latch feel better. Mother states pulling and tugging and denies pain with latch. I taught and mother returned demo of breast compressions to increase milk flow and reduce feeding duration. Breast compressions seemed to help infant stay interested in the breast for longer, as mother has stated infant has been falling asleep and coming off the breast after just a few minutes. Discussed how the quality of the feeding is always valued more than quantity of time that infant nurses. Discussed watching for things that indicate good feedings such as swallows, body language, and infant diaper output. Encouraged mother to continue working on positioning, suggested trying laid back position to help infant stay attached deeply. Discussed cluster feeding on night 2 with mother and ways to manage. Encouraged mother to continue feeding infant on demand whenever cues are shown and at least 8 times per 24 hours.

## 2021-02-01 NOTE — FLOWSHEET NOTE

## 2021-02-03 LAB
BLOOD BANK DISPENSE STATUS: NORMAL
BLOOD BANK DISPENSE STATUS: NORMAL
BLOOD BANK PRODUCT CODE: NORMAL
BLOOD BANK PRODUCT CODE: NORMAL
BPU ID: NORMAL
BPU ID: NORMAL
DESCRIPTION BLOOD BANK: NORMAL
DESCRIPTION BLOOD BANK: NORMAL

## 2021-12-04 ENCOUNTER — APPOINTMENT (OUTPATIENT)
Dept: GENERAL RADIOLOGY | Age: 36
End: 2021-12-04
Payer: COMMERCIAL

## 2021-12-04 ENCOUNTER — HOSPITAL ENCOUNTER (EMERGENCY)
Age: 36
Discharge: HOME | End: 2021-12-04
Attending: EMERGENCY MEDICINE
Payer: COMMERCIAL

## 2021-12-04 VITALS
HEIGHT: 68 IN | HEART RATE: 71 BPM | OXYGEN SATURATION: 100 % | WEIGHT: 170 LBS | BODY MASS INDEX: 25.76 KG/M2 | SYSTOLIC BLOOD PRESSURE: 111 MMHG | TEMPERATURE: 97.9 F | DIASTOLIC BLOOD PRESSURE: 67 MMHG | RESPIRATION RATE: 20 BRPM

## 2021-12-04 DIAGNOSIS — S43.014A CLOSED ANTERIOR DISLOCATION OF RIGHT SHOULDER, INITIAL ENCOUNTER: Primary | ICD-10-CM

## 2021-12-04 PROCEDURE — 99285 EMERGENCY DEPT VISIT HI MDM: CPT

## 2021-12-04 PROCEDURE — 96374 THER/PROPH/DIAG INJ IV PUSH: CPT

## 2021-12-04 PROCEDURE — 94761 N-INVAS EAR/PLS OXIMETRY MLT: CPT

## 2021-12-04 PROCEDURE — 99152 MOD SED SAME PHYS/QHP 5/>YRS: CPT

## 2021-12-04 PROCEDURE — 23650 CLTX SHO DSLC W/MNPJ WO ANES: CPT

## 2021-12-04 PROCEDURE — 2700000000 HC OXYGEN THERAPY PER DAY

## 2021-12-04 PROCEDURE — 2500000003 HC RX 250 WO HCPCS: Performed by: EMERGENCY MEDICINE

## 2021-12-04 PROCEDURE — 6360000002 HC RX W HCPCS: Performed by: EMERGENCY MEDICINE

## 2021-12-04 PROCEDURE — 73030 X-RAY EXAM OF SHOULDER: CPT

## 2021-12-04 PROCEDURE — 96375 TX/PRO/DX INJ NEW DRUG ADDON: CPT

## 2021-12-04 RX ORDER — ONDANSETRON 2 MG/ML
4 INJECTION INTRAMUSCULAR; INTRAVENOUS ONCE
Status: COMPLETED | OUTPATIENT
Start: 2021-12-04 | End: 2021-12-04

## 2021-12-04 RX ORDER — HYDROCODONE BITARTRATE AND ACETAMINOPHEN 5; 325 MG/1; MG/1
1 TABLET ORAL EVERY 6 HOURS PRN
Qty: 12 TABLET | Refills: 0 | Status: SHIPPED | OUTPATIENT
Start: 2021-12-04 | End: 2021-12-07

## 2021-12-04 RX ORDER — ETOMIDATE 2 MG/ML
15 INJECTION INTRAVENOUS ONCE
Status: COMPLETED | OUTPATIENT
Start: 2021-12-04 | End: 2021-12-04

## 2021-12-04 RX ORDER — MORPHINE SULFATE 2 MG/ML
4 INJECTION, SOLUTION INTRAMUSCULAR; INTRAVENOUS ONCE
Status: COMPLETED | OUTPATIENT
Start: 2021-12-04 | End: 2021-12-04

## 2021-12-04 RX ORDER — ETOMIDATE 2 MG/ML
INJECTION INTRAVENOUS DAILY PRN
Status: COMPLETED | OUTPATIENT
Start: 2021-12-04 | End: 2021-12-04

## 2021-12-04 RX ADMIN — ETOMIDATE 15 MG: 20 INJECTION, SOLUTION INTRAVENOUS at 15:43

## 2021-12-04 RX ADMIN — ETOMIDATE 15 MG: 2 INJECTION INTRAVENOUS at 15:48

## 2021-12-04 RX ADMIN — ONDANSETRON 4 MG: 2 INJECTION INTRAMUSCULAR; INTRAVENOUS at 14:58

## 2021-12-04 RX ADMIN — MORPHINE SULFATE 4 MG: 2 INJECTION, SOLUTION INTRAMUSCULAR; INTRAVENOUS at 14:57

## 2021-12-04 ASSESSMENT — PAIN SCALES - GENERAL
PAINLEVEL_OUTOF10: 8
PAINLEVEL_OUTOF10: 9
PAINLEVEL_OUTOF10: 8

## 2021-12-04 ASSESSMENT — PAIN DESCRIPTION - DESCRIPTORS: DESCRIPTORS: THROBBING

## 2021-12-04 ASSESSMENT — PAIN DESCRIPTION - ORIENTATION: ORIENTATION: RIGHT

## 2021-12-04 ASSESSMENT — PAIN DESCRIPTION - LOCATION: LOCATION: SHOULDER

## 2021-12-04 NOTE — ED PROVIDER NOTES
[facility]  eMERGENCY dEPARTMENT eNCOUnter      Pt Name: Arnel Morrow  MRN: [id number]  [place] 1985  Date of evaluation: 12/4/2021  Provider: Gary Ferris MD    CHIEF COMPLAINT       Chief Complaint   Patient presents with    Shoulder Injury         CRITICAL CARE TIME   Total Critical Care time was 0 minutes, excluding separately reportable procedures. There was a high probability of clinically significant/life threatening deterioration in the patient's condition which required my urgent intervention. HISTORY OF PRESENT ILLNESS  (Location/Symptom, Timing/Onset, Context/Setting, Quality, Duration, Modifying Factors, Severity.)   Arnel Morrow is a 29 y.o. female who presents to the emergency department complaint of an injury to her right shoulder that occurred just prior to arrival.  She fell down a step when she tripped over her dog and landed on her right shoulder. She thinks it is dislocated. She states it popped out in the past but never to the point where she has had to have it used. No head injury. No neck or back pain. No other upper or lower extremity pain. No numbness or tingling. Nursing Notes were reviewed and I agree. REVIEW OF SYSTEMS    (2-9 systems for level 4, 10 or more for level 5)     General: No fever or chills. HEENT: No head or facial injury. Cardiovascular: No chest or rib pain. Pulmonary: No shortness of breath. GI: No abdominal pain. Musculoskeletal: Right shoulder pain. Unable to move her shoulder. Musculoskeletal: No extremity weakness numbness or tingling. Neuro: No headache or dizziness. Denies head injury. Except as noted above the remainder of the review of systems was reviewed and negative. PAST MEDICAL HISTORY     Past Medical History:   Diagnosis Date    Vasovagal syncope          SURGICAL HISTORY     No past surgical history on file.       CURRENT MEDICATIONS       Previous Medications CALCIUM CARBONATE (TUMS) 500 MG CHEWABLE TABLET    Take 1 tablet by mouth daily    DOCUSATE SODIUM (COLACE, DULCOLAX) 100 MG CAPS    Take 100 mg by mouth 2 times daily    FAMOTIDINE (PEPCID) 20 MG TABLET    Take 20 mg by mouth 2 times daily    PRENATAL MV-MIN-FE FUM-FA-DHA (PRENATAL 1 PO)    Take by mouth       ALLERGIES     Patient has no known allergies. FAMILY HISTORY       Family History   Problem Relation Age of Onset    Cancer Mother         breast    High Cholesterol Father     High Blood Pressure Father           SOCIAL HISTORY       Social History     Socioeconomic History    Marital status: Single     Spouse name: Not on file    Number of children: Not on file    Years of education: Not on file    Highest education level: Not on file   Occupational History    Not on file   Tobacco Use    Smoking status: Never Smoker    Smokeless tobacco: Never Used   Vaping Use    Vaping Use: Never used   Substance and Sexual Activity    Alcohol use: Not Currently     Comment: occasionally      Drug use: No    Sexual activity: Yes     Partners: Male   Other Topics Concern    Not on file   Social History Narrative    Not on file     Social Determinants of Health     Financial Resource Strain:     Difficulty of Paying Living Expenses: Not on file   Food Insecurity:     Worried About Running Out of Food in the Last Year: Not on file    Vishnu of Food in the Last Year: Not on file   Transportation Needs:     Lack of Transportation (Medical): Not on file    Lack of Transportation (Non-Medical):  Not on file   Physical Activity:     Days of Exercise per Week: Not on file    Minutes of Exercise per Session: Not on file   Stress:     Feeling of Stress : Not on file   Social Connections:     Frequency of Communication with Friends and Family: Not on file    Frequency of Social Gatherings with Friends and Family: Not on file    Attends Rastafarian Services: Not on file   [employer] of Clubs or Organizations: Not on file    Attends Club or Organization Meetings: Not on file    Marital Status: Not on file   Intimate Partner Violence:     Fear of Current or Ex-Partner: Not on file    Emotionally Abused: Not on file    Physically Abused: Not on file    Sexually Abused: Not on file   Housing Stability:     Unable to Pay for Housing in the Last Year: Not on file    Number of [place] in the Last Year: Not on file    Unstable Housing in the Last Year: Not on file         PHYSICAL EXAM    (up to 7 for level 4, 8 or more for level 5)     ED Triage Vitals   BP Temp Temp src Pulse Resp SpO2 Height Weight   -- -- -- -- -- -- -- --       General: Alert white female no acute distress. Head: Atraumatic and normocephalic. Eyes: No conjunctival injection. Pupils equal round reactive. ENT: No facial trauma. TMs normal.  Nose clear. Oropharynx moist without erythema. Neck: Supple, nontender. No adenopathy. Heart: Regular rate and rhythm. No murmurs or gallops noted. Lungs: Breath sounds equal bilaterally and clear. Abdomen: Soft, nondistended, nontender. Musculoskeletal: Palpable drop-off in the area of the right humeral head consistent with a anterior dislocation. She is unable to move her right shoulder secondary to pain. She holds it against her body. She is able to move her wrist and fingers without difficulty. She has good distal pulses. She has intact capillary refill and sensation to touch. No left upper extremity pain, intact range of motion. No lower extremity pain, intact range of motion. Skin: Warm and dry, good turgor. No pallor or cyanosis. Neuro: Awake, alert, oriented. No focal motor deficits other than the limitation range of motion of the right shoulder as above. Intact sensation to touch. DIFFERENTIAL DIAGNOSIS   Differential includes but is not limited to shoulder sprain/strain, rotator cuff injury, dislocation, fracture, other.       DIAGNOSTIC RESULTS     EKG: All EKG's are interpreted by Murali Aranda MD in the absence of a cardiologist.      RADIOLOGY:   Non-plain film images such as CT, Ultrasound and MRI are read by the radiologist. Plain radiographic images are visualized and preliminarily interpreted Murali Aranda MD with the below findings:      Interpretation per the Radiologist below, if available at the time of this note:    XR SHOULDER RIGHT (MIN 2 VIEWS)   Final Result   Anatomic alignment, no fracture identified         XR SHOULDER RIGHT (MIN 2 VIEWS)   Final Result   Anterior inferior dislocation of the humerus               ED BEDSIDE ULTRASOUND:   Performed by ED Physician - none    LABS:  Labs Reviewed - No data to display    All other labs were within normal range or not returned as of this dictation. EMERGENCY DEPARTMENT COURSE and DIFFERENTIAL DIAGNOSIS/MDM:   Vitals:    Vitals:    12/04/21 1556 12/04/21 1557 12/04/21 1615 12/04/21 1630   BP: 87/63 118/72 120/71 111/67   Pulse: 79 71 84 71   Resp:  20     Temp:       TempSrc:       SpO2: 100% 100% 100% 100%   Weight:       Height: This patient presents with a right shoulder injury as above. Based on her history it sounds like she may have had a dislocation in the past that spontaneously reduced. Her x-ray here shows a closed anterior dislocation. She is neurovascularly intact. Conscious sedation was utilized and the shoulder was reduced on the first attempt. A postreduction x-ray showed reduction without evidence of fracture. Was placed in a sling and swath. I wrote for short-term pain control. She is going to follow-up with Dr. Lanie Cole per her preference. X-ray results, diagnosis, and treatment plan were discussed with the patient. She understands her treatment plan and follow-up as discussed.     Controlled Substance Monitoring:    Acute and Chronic Pain Monitoring:   RX Monitoring 12/4/2021   Periodic Controlled Substance Monitoring Possible medication side effects, risk of tolerance/dependence & alternative treatments discussed. ;No signs of potential drug abuse or diversion identified. CONSULTS:  None    PROCEDURES:  Procedural sedation    Date/Time: 12/4/2021 3:56 PM  Performed by: Hima Gruber MD  Authorized by: Hima Gruber MD     Consent:     Consent obtained:  Written    Consent given by:  Patient    Risks discussed:   Allergic reaction, dysrhythmia, inadequate sedation, nausea, vomiting, respiratory compromise necessitating ventilatory assistance and intubation, prolonged hypoxia resulting in organ damage and prolonged sedation necessitating reversal  Indications:     Procedure performed:  Dislocation reduction    Procedure necessitating sedation performed by:  Physician performing sedation    Intended level of sedation:  Moderate (conscious sedation)  Pre-sedation assessment:     Time since last food or drink:  1300    ASA classification: class 1 - normal, healthy patient      Neck mobility: normal      Mouth opening:  3 or more finger widths    Mallampati score:  II - soft palate, uvula, fauces visible    Pre-sedation assessments completed and reviewed: airway patency, anesthesia/sedation history, cardiovascular function, hydration status, mental status, nausea/vomiting, pain level and respiratory function      History of difficult intubation: no    Immediate pre-procedure details:     Reassessment: Patient reassessed immediately prior to procedure      Reviewed: vital signs, relevant labs/tests and NPO status      Verified: bag valve mask available, emergency equipment available, intubation equipment available, IV patency confirmed, oxygen available and suction available    Procedure details (see MAR for exact dosages):     Preoxygenation:  Nasal cannula    Sedation:  Etomidate    Intra-procedure monitoring:  Blood pressure monitoring, cardiac monitor, continuous pulse oximetry, frequent vital sign checks and frequent LOC assessments    Intra-procedure events: hypotension      Intra-procedure management:  Fluid bolus  Post-procedure details:     Attendance: Constant attendance by certified staff until patient recovered      Recovery: Patient returned to pre-procedure baseline      Estimated blood loss (see I/O flowsheets): no      Post-sedation assessments completed and reviewed: airway patency, cardiovascular function, hydration status, mental status, nausea/vomiting, pain level and respiratory function      Specimens recovered:  None    Patient is stable for discharge or admission: no      Patient tolerance: Tolerated well, no immediate complications  Comments:      Transient hypotension treated with a normal saline bolus. Ortho Injury    Date/Time: 12/4/2021 3:58 PM  Performed by: Anthony Mendez MD  Authorized by: Anthony Mendez MD   Consent: Verbal consent obtained. Written consent obtained. Risks and benefits: risks, benefits and alternatives were discussed  Consent given by: patient  Patient understanding: patient states understanding of the procedure being performed  Patient consent: the patient's understanding of the procedure matches consent given  Procedure consent: procedure consent matches procedure scheduled  Relevant documents: relevant documents present and verified  Test results: test results available and properly labeled  Site marked: the operative site was not marked  Imaging studies: imaging studies available  Required items: required blood products, implants, devices, and special equipment available  Patient identity confirmed: verbally with patient and arm band  Time out: Immediately prior to procedure a \"time out\" was called to verify the correct patient, procedure, equipment, support staff and site/side marked as required.   Injury location: shoulder  Location details: right shoulder  Injury type: dislocation  Dislocation type: anterior  Hill-Sachs deformity: no  Chronicity: new  Pre-procedure neurovascular assessment: neurovascularly intact  Pre-procedure distal perfusion: normal  Pre-procedure neurological function: normal  Pre-procedure range of motion: reduced    Anesthesia:  Local anesthesia used: no    Sedation:  Patient sedated: yes  Sedatives: etomidate    Manipulation performed: yes  Reduction method: traction and counter traction  Reduction successful: yes  X-ray confirmed reduction: yes  Immobilization: sling  Post-procedure neurovascular assessment: post-procedure neurovascularly intact  Post-procedure distal perfusion: normal  Post-procedure neurological function: normal  Post-procedure range of motion: normal  Patient tolerance: patient tolerated the procedure well with no immediate complications            FINAL IMPRESSION      1. Closed anterior dislocation of right shoulder, initial encounter          DISPOSITION/PLAN   DISPOSITION        PATIENT REFERRED TO:  João Mendezrayojodi, [address] [facility] [address]  [address] [place] [address]  [phone]    Schedule an appointment as soon as possible for a visit in 3 days        DISCHARGE MEDICATIONS:  New Prescriptions    HYDROCODONE-ACETAMINOPHEN (NORCO) 5-325 MG PER TABLET    Take 1 tablet by mouth every 6 hours as needed for Pain for up to 3 days. Intended supply: 3 days.  Take lowest dose possible to manage pain       (Please note that portions of this note were completed with a voice recognition program.  Efforts were made to edit the dictations but occasionally words are mis-transcribed.)    Flynn Vora MD  Attending Emergency Physician        Dunia Cavazos MD  12/04/21 7926

## 2021-12-06 ENCOUNTER — CARE COORDINATION (OUTPATIENT)
Dept: OTHER | Facility: CLINIC | Age: 36
End: 2021-12-06

## 2021-12-10 ENCOUNTER — OFFICE VISIT (OUTPATIENT)
Dept: ORTHOPEDIC SURGERY | Age: 36
End: 2021-12-10
Payer: COMMERCIAL

## 2021-12-10 VITALS — HEIGHT: 68 IN | WEIGHT: 170 LBS | BODY MASS INDEX: 25.76 KG/M2

## 2021-12-10 DIAGNOSIS — S43.004A DISLOCATION OF RIGHT SHOULDER JOINT, INITIAL ENCOUNTER: Primary | ICD-10-CM

## 2021-12-10 PROCEDURE — 99203 OFFICE O/P NEW LOW 30 MIN: CPT | Performed by: ORTHOPAEDIC SURGERY

## 2021-12-10 NOTE — LETTER
[facility]  [place] [address] [address] [address]  Phone: [phone]  Fax: [phone]    Catracho Escobedo MD        December 10, 2021     Patient: Beauty Mohs   YOB: 1985   Date of Visit: 12/10/2021       To Whom It May Concern: It is my medical opinion that Beauty Mohs may return to light duty immediately with the following restrictions: no lifting use of right hand for 6 weeks. If you have any questions or concerns, please don't hesitate to call.     Sincerely,        Catracho Escobedo MD

## 2021-12-10 NOTE — PROGRESS NOTES
CHIEF COMPLAINT: Right shoulder pain/ dislocation. DATE OF INJURY: 12/4/2021    HISTORY:  Ms. Reena Wright is a 29 y.o.  female right handed who presents today for evaluation of a right shoulder injury. The patient reports that this injury occurredafter a fall. She was first seen and evaluated in [facility], when she was x-rayed, relocated the shoulder and splinted, and asked to f/u with Orthopedics. The patient denies any other injuries. Movement makes the pain worse, the sling and resting makes the pain better. No numbness or tingling sensation. She works at [employer]. Past Medical History:   Diagnosis Date    Vasovagal syncope        History reviewed. No pertinent surgical history. Social History     Socioeconomic History    Marital status:      Spouse name: Not on file    Number of children: Not on file    Years of education: Not on file    Highest education level: Not on file   Occupational History    Not on file   Tobacco Use    Smoking status: Never Smoker    Smokeless tobacco: Never Used   Vaping Use    Vaping Use: Never used   Substance and Sexual Activity    Alcohol use: Not Currently     Comment: occasionally      Drug use: No    Sexual activity: Yes     Partners: Male   Other Topics Concern    Not on file   Social History Narrative    Not on file     Social Determinants of Health     Financial Resource Strain:     Difficulty of Paying Living Expenses: Not on file   Food Insecurity:     Worried About Running Out of Food in the Last Year: Not on file    Vishnu of Food in the Last Year: Not on file   Transportation Needs:     Lack of Transportation (Medical): Not on file    Lack of Transportation (Non-Medical):  Not on file   Physical Activity:     Days of Exercise per Week: Not on file    Minutes of Exercise per Session: Not on file   Stress:     Feeling of Stress : Not on file   Social Connections:     Frequency of Communication with Friends and Family: Not on file  Frequency of Social Gatherings with Friends and Family: Not on file    Attends Confucianist Services: Not on file    Active Member of Clubs or Organizations: Not on file    Attends Club or Organization Meetings: Not on file    Marital Status: Not on file   Intimate Partner Violence:     Fear of Current or Ex-Partner: Not on file    Emotionally Abused: Not on file    Physically Abused: Not on file    Sexually Abused: Not on file   Housing Stability:     Unable to Pay for Housing in the Last Year: Not on file    Number of [place] in the Last Year: Not on file    Unstable Housing in the Last Year: Not on file       Family History   Problem Relation Age of Onset    Cancer Mother         breast    High Cholesterol Father     High Blood Pressure Father        Current Outpatient Medications on File Prior to Visit   Medication Sig Dispense Refill    docusate sodium (COLACE, DULCOLAX) 100 MG CAPS Take 100 mg by mouth 2 times daily 60 capsule 1    famotidine (PEPCID) 20 MG tablet Take 20 mg by mouth 2 times daily      calcium carbonate (TUMS) 500 MG chewable tablet Take 1 tablet by mouth daily      Prenatal MV-Min-Fe Fum-FA-DHA (PRENATAL 1 PO) Take by mouth       No current facility-administered medications on file prior to visit. Pertinent items are noted in HPI  Review of systems reviewed from Patient History Form dated on 12/10/2021 and available in the patient's chart under the Media tab. No change noted. PHYSICAL EXAMINATION:  Ms. Silvia Banks is a very pleasant 29 y.o.  female who presents today in no acute distress, awake, alert, and oriented. She is well dressed, nourished and  groomed. Patient with normal affect. Height is  5' 8\" (1.727 m), weight is 170 lb (77.1 kg), Body mass index is 25.85 kg/m². Resting respiratory rate is 16. The patient walks with no limp. On evaluation of her bilateral upper extremity, there is no obvious deformity right shoulder.   There is minimal swelling and minimal ecchymosis. She is tender to palpation over the shoulder, and otherwise non tender over the remainder of the extremity. Range of motion is decreased secondary to pain over the right shoulder. The skin overlying the right shoulder is intact without evidence of lesion, laceration. Distal pulses are 2+ and symmetric bilaterally. Sensation is grossly intact to light touch and symmetric bilaterally. IMAGING:  Xrays dated 12/4/2021, 3 views of right shoulder were reviewed, and showed anterior dislocation with relocation xray. IMPRESSION:  Right shoulder anterior dislocation. PLAN:  I discussed that the overall alignment of the shoulder is good and that we can try to treat this non-operatively in a sling right shoulder, with no heavy impact activities, and gentle ROM with no external rotation for 6 weeks. We discussed the risk of redislocation. We will see her  back in 4 weeks at which time we will get a new xray of the right shoulder.         Rachana Landa MD

## 2021-12-13 ENCOUNTER — CARE COORDINATION (OUTPATIENT)
Dept: OTHER | Facility: CLINIC | Age: 36
End: 2021-12-13

## 2021-12-13 NOTE — CARE COORDINATION
Ambulatory Care Coordination Note  12/13/2021  CM Risk Score: 1  Charlson 10 Year Mortality Risk Score: 2%     ACC: Katrin Mae, RN    Summary Note: Pt doing well, no pain at all, will be able to work, she no longer has to wear the sling and swath. She follows up with Arebi in one month. Pt has lifting limitations on that arm. 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 Pt has no further identified needs. ACM will sign off at this time. Care Coordination Interventions    Program Enrollment: Complex Care  Referral from Primary Care Provider: No  Suggested Interventions and Community Resources  Disease Specific Clinic: In Process (Comment: Dr Kyung Fitzpatrick ( Orthopedics))         Goals Addressed                    This Visit's Progress      COMPLETED: Patient Stated (pt-stated)         Pt will follow up with Ortho Friday 12/10/21    Barriers: none  Plan for overcoming my barriers: N/A  Confidence: 10/10  Anticipated Goal Completion Date: 12/20/21             Prior to Admission medications    Medication Sig Start Date End Date Taking? Authorizing Provider   docusate sodium (COLACE, DULCOLAX) 100 MG CAPS Take 100 mg by mouth 2 times daily 2/1/21   Parish Duenas MD   famotidine (PEPCID) 20 MG tablet Take 20 mg by mouth 2 times daily    Historical Provider, MD   calcium carbonate (TUMS) 500 MG chewable tablet Take 1 tablet by mouth daily    Historical Provider, MD   Prenatal MV-Min-Fe Fum-FA-DHA (PRENATAL 1 PO) Take by mouth    Historical Provider, MD       No future appointments.      Norberto ALLENN, RN- [facility]  Associate Care Manager  [phone]

## 2021-12-15 ENCOUNTER — TELEPHONE (OUTPATIENT)
Dept: ORTHOPEDIC SURGERY | Age: 36
End: 2021-12-15

## 2021-12-15 NOTE — TELEPHONE ENCOUNTER
COMPLETED APS/ACCOMMODATIONS FORM FOR THE [employer] AND ALERTED [facility] TO PRINT OFF FOR PATIENT TO  @ THE [place] OFFICE PER PATIENT SIGNED RELEASE. LM LETTING PATIENT KNOW READY FOR .

## 2023-05-01 RX ORDER — CEFDINIR 300 MG/1
300 CAPSULE ORAL 2 TIMES DAILY
Qty: 14 CAPSULE | Refills: 0 | Status: SHIPPED | OUTPATIENT
Start: 2023-05-01 | End: 2023-05-08

## 2025-05-06 ENCOUNTER — APPOINTMENT (OUTPATIENT)
Dept: GENERAL RADIOLOGY | Age: 40
End: 2025-05-06
Payer: COMMERCIAL

## 2025-05-06 ENCOUNTER — HOSPITAL ENCOUNTER (EMERGENCY)
Age: 40
Discharge: HOME | End: 2025-05-06
Payer: COMMERCIAL

## 2025-05-06 VITALS
OXYGEN SATURATION: 99 % | DIASTOLIC BLOOD PRESSURE: 74 MMHG | HEART RATE: 84 BPM | BODY MASS INDEX: 25.76 KG/M2 | HEIGHT: 68 IN | WEIGHT: 170 LBS | SYSTOLIC BLOOD PRESSURE: 129 MMHG | RESPIRATION RATE: 16 BRPM | TEMPERATURE: 97.9 F

## 2025-05-06 DIAGNOSIS — S43.401A SPRAIN OF RIGHT SHOULDER, UNSPECIFIED SHOULDER SPRAIN TYPE, INITIAL ENCOUNTER: Primary | ICD-10-CM

## 2025-05-06 PROCEDURE — 73030 X-RAY EXAM OF SHOULDER: CPT

## 2025-05-06 PROCEDURE — 99283 EMERGENCY DEPT VISIT LOW MDM: CPT

## 2025-05-06 ASSESSMENT — PAIN DESCRIPTION - PAIN TYPE: TYPE: ACUTE PAIN

## 2025-05-06 ASSESSMENT — LIFESTYLE VARIABLES
HOW OFTEN DO YOU HAVE A DRINK CONTAINING ALCOHOL: NEVER
HOW MANY STANDARD DRINKS CONTAINING ALCOHOL DO YOU HAVE ON A TYPICAL DAY: PATIENT DOES NOT DRINK

## 2025-05-06 ASSESSMENT — PAIN DESCRIPTION - ORIENTATION: ORIENTATION: RIGHT

## 2025-05-06 ASSESSMENT — PAIN - FUNCTIONAL ASSESSMENT
PAIN_FUNCTIONAL_ASSESSMENT: ACTIVITIES ARE NOT PREVENTED
PAIN_FUNCTIONAL_ASSESSMENT: 0-10

## 2025-05-06 ASSESSMENT — PAIN SCALES - GENERAL: PAINLEVEL_OUTOF10: 3

## 2025-05-06 ASSESSMENT — PAIN DESCRIPTION - LOCATION: LOCATION: SHOULDER

## 2025-05-06 NOTE — ED PROVIDER NOTES
[facility] EMERGENCY DEPARTMENT  EMERGENCY DEPARTMENT ENCOUNTER        Pt Name: Zoe Rueda  MRN: [id number]  Birthdate 1985  Date of evaluation: 5/6/2025  Provider: ERUM Alonzo  PCP: Ovidio Hughes MD  Note Started: 2:18 PM EDT 5/6/25      YONI. I have evaluated this patient.        CHIEF COMPLAINT       Chief Complaint   Patient presents with    Shoulder Injury     Pt states right sided shoulder injury. Pt states hx of dislocation. Was putting a clipboard away and felt pop in right shoulder.        HISTORY OF PRESENT ILLNESS: 1 or more Elements     History From: patient  Limitations to history : None    Zoe Rueda is a 39 y.o. female who presents to the emergency room due to right shoulder pain.  Patient states that she was lifting up a clipboard when she felt a pop in her right shoulder.  She states that she was concerned that she may have dislocated her shoulder as she has done this in the past.  She did state that she felt like it may have slid back into position however she does have some pain and subjective tingling sensation in the shoulder and down the right arm.  She denies any other injury at this time.  Patient was offered pain medication but declined.    Nursing Notes were all reviewed and agreed with or any disagreements were addressed in the HPI.    REVIEW OF SYSTEMS :      Review of Systems   Musculoskeletal:         Right shoulder pain       Positives and Pertinent negatives as per HPI.     SURGICAL HISTORY   No past surgical history on file.    CURRENTMEDICATIONS       There are no discharge medications for this patient.      ALLERGIES     Patient has no known allergies.    FAMILYHISTORY       Family History   Problem Relation Age of Onset    Cancer Mother         breast    High Cholesterol Father     High Blood Pressure Father         SOCIAL HISTORY       Social History     Tobacco Use    Smoking status: Never    Smokeless tobacco: Never   Vaping Use    Vaping

## 2025-05-06 NOTE — DISCHARGE INSTRUCTIONS
Follow up with your primary care provider in one week for a recheck    Take Tylenol ibuprofen for pain if needed    Wear sling for comfort.    Follow-up with orthopedics    Return to the ED if you have any worsening symptoms.

## 2025-05-08 ENCOUNTER — OFFICE VISIT (OUTPATIENT)
Dept: ORTHOPEDIC SURGERY | Age: 40
End: 2025-05-08
Payer: COMMERCIAL

## 2025-05-08 VITALS — RESPIRATION RATE: 16 BRPM | WEIGHT: 170 LBS | HEIGHT: 68 IN | BODY MASS INDEX: 25.76 KG/M2

## 2025-05-08 DIAGNOSIS — S43.431A GLENOID LABRAL TEAR, RIGHT, INITIAL ENCOUNTER: Primary | ICD-10-CM

## 2025-05-08 DIAGNOSIS — M25.311 INSTABILITY OF RIGHT SHOULDER JOINT: ICD-10-CM

## 2025-05-08 PROCEDURE — 99204 OFFICE O/P NEW MOD 45 MIN: CPT | Performed by: PHYSICIAN ASSISTANT

## 2025-06-06 LAB
CHOLEST SERPL-MCNC: 176 MG/DL (ref 0–199)
GLUCOSE SERPL-MCNC: 85 MG/DL (ref 70–99)
HDLC SERPL-MCNC: 46 MG/DL (ref 40–60)
LDLC SERPL CALC-MCNC: 116 MG/DL
TRIGL SERPL-MCNC: 70 MG/DL (ref 0–150)